# Patient Record
Sex: FEMALE | Race: WHITE | NOT HISPANIC OR LATINO | URBAN - METROPOLITAN AREA
[De-identification: names, ages, dates, MRNs, and addresses within clinical notes are randomized per-mention and may not be internally consistent; named-entity substitution may affect disease eponyms.]

---

## 2017-10-30 ENCOUNTER — ALLSCRIPTS OFFICE VISIT (OUTPATIENT)
Dept: OTHER | Facility: OTHER | Age: 37
End: 2017-10-30

## 2017-10-30 ENCOUNTER — GENERIC CONVERSION - ENCOUNTER (OUTPATIENT)
Dept: OTHER | Facility: OTHER | Age: 37
End: 2017-10-30

## 2017-10-30 DIAGNOSIS — S39.012A STRAIN OF MUSCLE, FASCIA AND TENDON OF LOWER BACK, INITIAL ENCOUNTER: ICD-10-CM

## 2017-10-31 NOTE — PROGRESS NOTES
Assessment  1  Lumbar strain (847 2) (S39 012A)    Plan  PMH: Lumbar strain    · Cyclobenzaprine HCl - 10 MG Oral Tablet; TAKE 1 TABLET AT BEDTIME AS  NEEDED   · Naproxen 500 MG Oral Tablet; TAKE 1 TABLET EVERY 12 HOURS AS NEEDED  FOR PAIN   · *1 - [facility]  *  Status: Active  Requested for: [id number]  Care Summary provided  : Yes    Discussion/Summary    Consider x-ray if no improvement in symptoms  She will call if any worsening or persisting symptoms or if new symptoms develop  She will follow-up with her gynecologist for routine GYN care  I do not think her birth control pills are causing or exacerbating her current symptoms  History of Present Illness  Patient with lumbar strain symptoms for the last one month  She has bilateral paralumbar spasm and strain  Denies any trauma  No radiculopathy  Denies any bowel or bladder dysfunction  She was on birth control pills but decided to stop aching that this may be related to her lumbar pain  Symptoms have not improved  Changing position at night while sleeping is sometimes uncomfortable  Review of Systems    Constitutional: No fever, no chills, feels well, no tiredness, no recent weight gain or weight loss  Eyes: No complaints of eye pain, no red eyes, no eyesight problems, no discharge, no dry eyes, no itching of eyes  ENT: no complaints of earache, no loss of hearing, no nose bleeds, no nasal discharge, no sore throat, no hoarseness  Cardiovascular: No complaints of slow heart rate, no fast heart rate, no chest pain, no palpitations, no leg claudication, no lower extremity edema  Respiratory: No complaints of shortness of breath, no wheezing, no cough, no SOB on exertion, no orthopnea, no PND  Gastrointestinal: No complaints of abdominal pain, no constipation, no nausea or vomiting, no diarrhea, no bloody stools     Genitourinary: No complaints of dysuria, no incontinence, no pelvic pain, no dysmenorrhea, no vaginal discharge or bleeding  Musculoskeletal: as noted in HPI  Integumentary: No complaints of skin rash or lesions, no itching, no skin wounds, no breast pain or lump  Neurological: No complaints of headache, no confusion, no convulsions, no numbness, no dizziness or fainting, no tingling, no limb weakness, no difficulty walking  Psychiatric: Not suicidal, no sleep disturbance, no anxiety or depression, no change in personality, no emotional problems  Endocrine: No complaints of proptosis, no hot flashes, no muscle weakness, no deepening of the voice, no feelings of weakness  Hematologic/Lymphatic: No complaints of swollen glands, no swollen glands in the neck, does not bleed easily, does not bruise easily  Active Problems  1  Acute bronchitis (466 0) (J20 9)   2  Contusion of skin with intact surface (924 9) (T14 8XXA)    Past Medical History  1  History of Lumbar strain (847 2) (S39 012A)    Social History   · Never a smoker  The social history was reviewed and updated today  The social history was reviewed and is unchanged  Current Meds   1  [facility] Daily Multiple Oral Tablet; Take 1 tablet daily Recorded    The medication list was reviewed and updated today  Allergies  1  Sulfa Drugs    Vitals  Vital Signs    Recorded: [id number] 00:58WH   Systolic 737   Diastolic 62     Physical Exam    Constitutional   General appearance: No acute distress, well appearing and well nourished  Eyes   Conjunctiva and lids: No swelling, erythema or discharge  Pupils and irises: Equal, round and reactive to light  Ears, Nose, Mouth, and Throat   External inspection of ears and nose: Normal     Otoscopic examination: Tympanic membranes translucent with normal light reflex  Canals patent without erythema  Nasal mucosa, septum, and turbinates: Normal without edema or erythema  Oropharynx: Normal with no erythema, edema, exudate or lesions      Pulmonary   Respiratory effort: No increased work of breathing or signs of respiratory distress  Auscultation of lungs: Clear to auscultation  Cardiovascular   Palpation of heart: Normal PMI, no thrills  Auscultation of heart: Normal rate and rhythm, normal S1 and S2, without murmurs  Examination of extremities for edema and/or varicosities: Normal     Carotid pulses: Normal     Abdomen   Abdomen: Non-tender, no masses  Liver and spleen: No hepatomegaly or splenomegaly  Lymphatic   Palpation of lymph nodes in neck: No lymphadenopathy  Musculoskeletal   Gait and station: Normal     Digits and nails: Normal without clubbing or cyanosis  Inspection/palpation of joints, bones, and muscles: Normal     Skin   Skin and subcutaneous tissue: Normal without rashes or lesions  Neurologic   Cranial nerves: Cranial nerves 2-12 intact  Reflexes: 2+ and symmetric  Sensation: No sensory loss  Psychiatric   Orientation to person, place, and time: Normal     Mood and affect: Normal          Results/Data  PHQ-2 Adult Depression Screening 30Oct2017 09:21AM User, [facility]     Test Name Result Flag Reference   PHQ-2 Adult Depression Score 0     Over the last two weeks, how often have you been bothered by any of the following problems?   Little interest or pleasure in doing things: Not at all - 0  Feeling down, depressed, or hopeless: Not at all - 0   PHQ-2 Adult Depression Screening Negative         Signatures   Electronically signed by : Laurie Gloria DO; Oct 30 2017 10:11AM EST                       (Author)

## 2018-01-13 VITALS — DIASTOLIC BLOOD PRESSURE: 62 MMHG | SYSTOLIC BLOOD PRESSURE: 100 MMHG

## 2018-01-14 VITALS — HEIGHT: 62 IN | TEMPERATURE: 97.9 F | BODY MASS INDEX: 22.08 KG/M2 | WEIGHT: 120 LBS

## 2018-11-08 ENCOUNTER — TELEPHONE (OUTPATIENT)
Dept: FAMILY MEDICINE CLINIC | Facility: CLINIC | Age: 38
End: 2018-11-08

## 2018-11-08 NOTE — TELEPHONE ENCOUNTER
Received a medical release of information from the [address] [place] [address]  Form faxed to [facility] for processing on 11/8/2018